# Patient Record
Sex: FEMALE | Race: WHITE | NOT HISPANIC OR LATINO | Employment: FULL TIME | URBAN - METROPOLITAN AREA
[De-identification: names, ages, dates, MRNs, and addresses within clinical notes are randomized per-mention and may not be internally consistent; named-entity substitution may affect disease eponyms.]

---

## 2017-02-28 ENCOUNTER — TRANSCRIBE ORDERS (OUTPATIENT)
Dept: ADMINISTRATIVE | Facility: HOSPITAL | Age: 52
End: 2017-02-28

## 2017-02-28 DIAGNOSIS — R04.2 COUGH WITH HEMOPTYSIS: Primary | ICD-10-CM

## 2017-03-07 ENCOUNTER — APPOINTMENT (OUTPATIENT)
Dept: CT IMAGING | Facility: HOSPITAL | Age: 52
End: 2017-03-07
Attending: INTERNAL MEDICINE

## 2017-03-15 ENCOUNTER — APPOINTMENT (OUTPATIENT)
Dept: CT IMAGING | Facility: HOSPITAL | Age: 52
End: 2017-03-15
Attending: INTERNAL MEDICINE

## 2018-01-23 ENCOUNTER — TRANSCRIBE ORDERS (OUTPATIENT)
Dept: MAMMOGRAPHY | Facility: HOSPITAL | Age: 53
End: 2018-01-23

## 2018-01-23 DIAGNOSIS — Z12.31 VISIT FOR SCREENING MAMMOGRAM: Primary | ICD-10-CM

## 2018-01-25 ENCOUNTER — HOSPITAL ENCOUNTER (OUTPATIENT)
Dept: MAMMOGRAPHY | Facility: HOSPITAL | Age: 53
Discharge: HOME | End: 2018-01-25
Attending: INTERNAL MEDICINE | Admitting: INTERNAL MEDICINE

## 2018-01-25 DIAGNOSIS — Z12.31 VISIT FOR SCREENING MAMMOGRAM: ICD-10-CM

## 2018-01-25 PROCEDURE — 77067 SCR MAMMO BI INCL CAD: CPT

## 2018-01-25 PROCEDURE — 77063 BREAST TOMOSYNTHESIS BI: CPT

## 2018-01-25 PROCEDURE — 77063 BREAST TOMOSYNTHESIS BI: CPT | Performed by: RADIOLOGY

## 2018-01-25 PROCEDURE — 77067 SCR MAMMO BI INCL CAD: CPT | Performed by: RADIOLOGY

## 2018-11-27 ENCOUNTER — HOSPITAL ENCOUNTER (OUTPATIENT)
Dept: GENERAL RADIOLOGY | Facility: HOSPITAL | Age: 53
Discharge: HOME | End: 2018-11-27
Attending: INTERNAL MEDICINE | Admitting: INTERNAL MEDICINE

## 2018-11-27 ENCOUNTER — TRANSCRIBE ORDERS (OUTPATIENT)
Dept: ADMINISTRATIVE | Facility: HOSPITAL | Age: 53
End: 2018-11-27

## 2018-11-27 DIAGNOSIS — M25.562 LEFT KNEE PAIN, UNSPECIFIED CHRONICITY: Primary | ICD-10-CM

## 2018-11-27 DIAGNOSIS — M79.675 PAIN OF LEFT GREAT TOE: ICD-10-CM

## 2018-11-27 PROCEDURE — 73560 X-RAY EXAM OF KNEE 1 OR 2: CPT

## 2018-11-27 PROCEDURE — 73660 X-RAY EXAM OF TOE(S): CPT

## 2019-08-02 ENCOUNTER — TRANSCRIBE ORDERS (OUTPATIENT)
Dept: MAMMOGRAPHY | Facility: HOSPITAL | Age: 54
End: 2019-08-02

## 2019-08-02 DIAGNOSIS — Z12.31 VISIT FOR SCREENING MAMMOGRAM: Primary | ICD-10-CM

## 2022-05-04 ENCOUNTER — OFFICE VISIT (OUTPATIENT)
Dept: FAMILY MEDICINE CLINIC | Facility: CLINIC | Age: 57
End: 2022-05-04

## 2022-05-04 VITALS
BODY MASS INDEX: 23.11 KG/M2 | OXYGEN SATURATION: 98 % | WEIGHT: 130.4 LBS | HEART RATE: 93 BPM | TEMPERATURE: 98.7 F | HEIGHT: 63 IN | DIASTOLIC BLOOD PRESSURE: 80 MMHG | SYSTOLIC BLOOD PRESSURE: 128 MMHG

## 2022-05-04 DIAGNOSIS — J30.9 ALLERGIC RHINITIS, UNSPECIFIED SEASONALITY, UNSPECIFIED TRIGGER: Primary | ICD-10-CM

## 2022-05-04 PROBLEM — M25.552 PAIN OF LEFT HIP JOINT: Status: ACTIVE | Noted: 2022-03-02

## 2022-05-04 PROBLEM — M79.18 MYOFASCIAL PAIN SYNDROME OF THORACIC SPINE: Status: ACTIVE | Noted: 2022-03-02

## 2022-05-04 PROBLEM — M54.50 LOW BACK PAIN: Status: ACTIVE | Noted: 2022-03-02

## 2022-05-04 PROBLEM — F11.93 OPIOID WITHDRAWAL: Status: ACTIVE | Noted: 2022-03-02

## 2022-05-04 PROCEDURE — 99203 OFFICE O/P NEW LOW 30 MIN: CPT | Performed by: NURSE PRACTITIONER

## 2022-05-04 RX ORDER — SERTRALINE HYDROCHLORIDE 100 MG/1
TABLET, FILM COATED ORAL
COMMUNITY
End: 2022-07-12 | Stop reason: SDUPTHER

## 2022-05-04 RX ORDER — DICLOFENAC SODIUM 75 MG/1
TABLET, DELAYED RELEASE ORAL
COMMUNITY
End: 2022-05-04

## 2022-05-04 RX ORDER — LORATADINE AND PSEUDOEPHEDRINE SULFATE 5; 120 MG/1; MG/1
1 TABLET, EXTENDED RELEASE ORAL 2 TIMES DAILY
Qty: 28 TABLET | Refills: 0 | Status: SHIPPED | OUTPATIENT
Start: 2022-05-04 | End: 2022-09-13

## 2022-05-04 RX ORDER — FLUTICASONE PROPIONATE 50 MCG
2 SPRAY, SUSPENSION (ML) NASAL DAILY
Qty: 18.2 ML | Refills: 2 | Status: SHIPPED | OUTPATIENT
Start: 2022-05-04 | End: 2022-09-13

## 2022-05-04 NOTE — PROGRESS NOTES
"Chief Complaint  Cough (Pt was a smoker and recently stop and has a lingering cough for about 2 months ), Sinus Problem (Pt states that she had a cold 2 month ago and since then her lymph nodes have been swollen. ), and Establish Care    Subjective          Marj Walden presents to [facility]  Pt is here today with sinus symptoms (sneezing, nasal congestion, swollen lymph nodes). She took antibiotics about 2-3 weeks ago. States symptoms have been on-going for about 2 months. States symptoms are not getting worse, just not resolving. Pt states she is coughing.     Pt reports that a tv fell on her head about 3-4 months ago. She was not evaluated anywhere after injury. Since that injury, she has been sensitive to light and is having mild headaches. She does not report severe headaches or extreme fatigue out of normal.     Pt reports swallowing difficulty; this is not a new problem. She had scope about a year ago and esopegal dilatation.       Objective   Vital Signs:   /80   Pulse 93   Temp 98.7 °F (37.1 °C)   Ht 160 cm (63\")   Wt 59.1 kg (130 lb 6.4 oz)   SpO2 98%   BMI 23.10 kg/m²     Physical Exam  Constitutional:       Appearance: Normal appearance.   HENT:      Right Ear: Tympanic membrane, ear canal and external ear normal.      Left Ear: Tympanic membrane, ear canal and external ear normal.      Nose: Congestion present.      Mouth/Throat:      Mouth: Mucous membranes are moist.   Cardiovascular:      Rate and Rhythm: Normal rate and regular rhythm.      Heart sounds: Normal heart sounds.   Pulmonary:      Effort: Pulmonary effort is normal.      Breath sounds: Normal breath sounds.   Skin:     General: Skin is warm.   Neurological:      Mental Status: She is alert and oriented to person, place, and time.   Psychiatric:         Mood and Affect: Mood normal.         Behavior: Behavior normal.        Result Review :{Labs  Result Review  Imaging  Med Tab  Media  " Procedures :23}                 Assessment and Plan    Diagnoses and all orders for this visit:    1. Allergic rhinitis, unspecified seasonality, unspecified trigger (Primary)  -     loratadine-pseudoephedrine (Claritin-D 12 Hour) 5-120 MG per 12 hr tablet; Take 1 tablet by mouth 2 (Two) Times a Day for 14 days.  Dispense: 28 tablet; Refill: 0  -     fluticasone (Flonase) 50 MCG/ACT nasal spray; 2 sprays into the nostril(s) as directed by provider Daily for 30 days.  Dispense: 18.2 mL; Refill: 2      BMI has not been calculated during today's encounter.          Follow Up   Return in about 1 month (around 6/4/2022) for Recheck.  Patient was given instructions and counseling regarding her condition or for health maintenance advice. Please see specific information pulled into the AVS if appropriate.

## 2022-05-19 ENCOUNTER — OFFICE VISIT (OUTPATIENT)
Dept: FAMILY MEDICINE CLINIC | Facility: CLINIC | Age: 57
End: 2022-05-19

## 2022-05-19 VITALS
HEIGHT: 63 IN | OXYGEN SATURATION: 98 % | WEIGHT: 128 LBS | DIASTOLIC BLOOD PRESSURE: 74 MMHG | HEART RATE: 89 BPM | SYSTOLIC BLOOD PRESSURE: 120 MMHG | TEMPERATURE: 97.7 F | BODY MASS INDEX: 22.68 KG/M2

## 2022-05-19 DIAGNOSIS — R68.89 SENSITIVITY TO LIGHT: Primary | ICD-10-CM

## 2022-05-19 DIAGNOSIS — J32.9 SINUSITIS, UNSPECIFIED CHRONICITY, UNSPECIFIED LOCATION: ICD-10-CM

## 2022-05-19 PROCEDURE — 99213 OFFICE O/P EST LOW 20 MIN: CPT | Performed by: NURSE PRACTITIONER

## 2022-05-19 RX ORDER — AMOXICILLIN AND CLAVULANATE POTASSIUM 875; 125 MG/1; MG/1
1 TABLET, FILM COATED ORAL 2 TIMES DAILY
Qty: 14 TABLET | Refills: 0 | Status: SHIPPED | OUTPATIENT
Start: 2022-05-19 | End: 2022-05-26

## 2022-05-19 NOTE — PROGRESS NOTES
"Chief Complaint  Allergies (1 mo f/u Pt states she would like to have labs done today./)    Subjective          Marj Walden presents to [facility]  Pt is here today for follow up. She was seen about a month ago for allergy symptoms. She states she had a tv fall on her head about a month ago and symptoms worsened after that. She did not get evaluated after her injury. She did have headaches after the injury. She states she does not have headaches now, but she feels like something is not right. She states she does feels hazy and she has light sensitivity. She has been taking diclofenac daily. She would like to be referred to Neurology for further evaluation.    She is also having trouble going to sleep. We discussed melatonin and also limiting caffeine intake late in the day. She is going to restrict caffeine intake and see if that helps.       Objective   Vital Signs:  /74   Pulse 89   Temp 97.7 °F (36.5 °C)   Ht 160 cm (63\")   Wt 58.1 kg (128 lb)   SpO2 98%   BMI 22.67 kg/m²   BMI is within normal parameters. No other follow-up for BMI required.      Physical Exam  Vitals reviewed.   Constitutional:       Appearance: Normal appearance.   HENT:      Nose: Congestion and rhinorrhea present.      Right Sinus: Maxillary sinus tenderness and frontal sinus tenderness present.      Left Sinus: Maxillary sinus tenderness and frontal sinus tenderness present.      Mouth/Throat:      Mouth: Mucous membranes are moist.   Cardiovascular:      Rate and Rhythm: Normal rate and regular rhythm.      Heart sounds: Normal heart sounds.   Pulmonary:      Effort: Pulmonary effort is normal.      Breath sounds: Normal breath sounds.   Musculoskeletal:         General: Normal range of motion.   Skin:     General: Skin is warm.   Neurological:      Mental Status: She is alert and oriented to person, place, and time.   Psychiatric:         Mood and Affect: Mood normal.         Behavior: " Behavior normal.        Result Review :                 Assessment and Plan    Diagnoses and all orders for this visit:    1. Sensitivity to light (Primary)  -     Ambulatory Referral to Neurology    2. Sinusitis, unspecified chronicity, unspecified location  -     amoxicillin-clavulanate (Augmentin) 875-125 MG per tablet; Take 1 tablet by mouth 2 (Two) Times a Day for 7 days.  Dispense: 14 tablet; Refill: 0             Follow Up   Return in about 1 month (around 6/19/2022) for Annual.  Patient was given instructions and counseling regarding her condition or for health maintenance advice. Please see specific information pulled into the AVS if appropriate.

## 2022-06-10 ENCOUNTER — LAB (OUTPATIENT)
Dept: LAB | Facility: HOSPITAL | Age: 57
End: 2022-06-10

## 2022-06-10 ENCOUNTER — OFFICE VISIT (OUTPATIENT)
Dept: FAMILY MEDICINE CLINIC | Facility: CLINIC | Age: 57
End: 2022-06-10

## 2022-06-10 VITALS
HEIGHT: 63 IN | WEIGHT: 132 LBS | OXYGEN SATURATION: 96 % | DIASTOLIC BLOOD PRESSURE: 64 MMHG | TEMPERATURE: 98 F | BODY MASS INDEX: 23.39 KG/M2 | SYSTOLIC BLOOD PRESSURE: 108 MMHG | HEART RATE: 83 BPM

## 2022-06-10 DIAGNOSIS — N89.8 VAGINAL ITCHING: ICD-10-CM

## 2022-06-10 DIAGNOSIS — Z00.00 ANNUAL PHYSICAL EXAM: Primary | ICD-10-CM

## 2022-06-10 DIAGNOSIS — Z86.39 HISTORY OF ELEVATED LIPIDS: ICD-10-CM

## 2022-06-10 DIAGNOSIS — S09.90XD HEAD TRAUMA, SUBSEQUENT ENCOUNTER: ICD-10-CM

## 2022-06-10 DIAGNOSIS — T14.8XXA BRUISING: ICD-10-CM

## 2022-06-10 DIAGNOSIS — R53.83 FATIGUE, UNSPECIFIED TYPE: ICD-10-CM

## 2022-06-10 DIAGNOSIS — Z00.00 ANNUAL PHYSICAL EXAM: ICD-10-CM

## 2022-06-10 DIAGNOSIS — B37.31 VAGINAL YEAST INFECTION: ICD-10-CM

## 2022-06-10 LAB
25(OH)D3 SERPL-MCNC: 21.8 NG/ML (ref 30–100)
ALBUMIN SERPL-MCNC: 4.8 G/DL (ref 3.5–5.2)
ALBUMIN/GLOB SERPL: 2.5 G/DL
ALP SERPL-CCNC: 89 U/L (ref 39–117)
ALT SERPL W P-5'-P-CCNC: 25 U/L (ref 1–33)
ANION GAP SERPL CALCULATED.3IONS-SCNC: 11.2 MMOL/L (ref 5–15)
AST SERPL-CCNC: 24 U/L (ref 1–32)
BASOPHILS # BLD AUTO: 0.04 10*3/MM3 (ref 0–0.2)
BASOPHILS NFR BLD AUTO: 0.5 % (ref 0–1.5)
BILIRUB SERPL-MCNC: 0.3 MG/DL (ref 0–1.2)
BUN SERPL-MCNC: 10 MG/DL (ref 6–20)
BUN/CREAT SERPL: 12.3 (ref 7–25)
CALCIUM SPEC-SCNC: 9.5 MG/DL (ref 8.6–10.5)
CHLORIDE SERPL-SCNC: 103 MMOL/L (ref 98–107)
CHOLEST SERPL-MCNC: 216 MG/DL (ref 0–200)
CO2 SERPL-SCNC: 24.8 MMOL/L (ref 22–29)
CREAT SERPL-MCNC: 0.81 MG/DL (ref 0.57–1)
DEPRECATED RDW RBC AUTO: 44.2 FL (ref 37–54)
EGFRCR SERPLBLD CKD-EPI 2021: 84.8 ML/MIN/1.73
EOSINOPHIL # BLD AUTO: 0.28 10*3/MM3 (ref 0–0.4)
EOSINOPHIL NFR BLD AUTO: 3.7 % (ref 0.3–6.2)
ERYTHROCYTE [DISTWIDTH] IN BLOOD BY AUTOMATED COUNT: 12.4 % (ref 12.3–15.4)
GLOBULIN UR ELPH-MCNC: 1.9 GM/DL
GLUCOSE SERPL-MCNC: 94 MG/DL (ref 65–99)
HCT VFR BLD AUTO: 45.7 % (ref 34–46.6)
HDLC SERPL-MCNC: 69 MG/DL (ref 40–60)
HGB BLD-MCNC: 15.5 G/DL (ref 12–15.9)
IMM GRANULOCYTES # BLD AUTO: 0.04 10*3/MM3 (ref 0–0.05)
IMM GRANULOCYTES NFR BLD AUTO: 0.5 % (ref 0–0.5)
LDLC SERPL CALC-MCNC: 130 MG/DL (ref 0–100)
LDLC/HDLC SERPL: 1.85 {RATIO}
LYMPHOCYTES # BLD AUTO: 2.11 10*3/MM3 (ref 0.7–3.1)
LYMPHOCYTES NFR BLD AUTO: 28.2 % (ref 19.6–45.3)
MCH RBC QN AUTO: 33.4 PG (ref 26.6–33)
MCHC RBC AUTO-ENTMCNC: 33.9 G/DL (ref 31.5–35.7)
MCV RBC AUTO: 98.5 FL (ref 79–97)
MONOCYTES # BLD AUTO: 0.6 10*3/MM3 (ref 0.1–0.9)
MONOCYTES NFR BLD AUTO: 8 % (ref 5–12)
NEUTROPHILS NFR BLD AUTO: 4.42 10*3/MM3 (ref 1.7–7)
NEUTROPHILS NFR BLD AUTO: 59.1 % (ref 42.7–76)
NRBC BLD AUTO-RTO: 0 /100 WBC (ref 0–0.2)
PLATELET # BLD AUTO: 240 10*3/MM3 (ref 140–450)
PMV BLD AUTO: 11.2 FL (ref 6–12)
POTASSIUM SERPL-SCNC: 4.6 MMOL/L (ref 3.5–5.2)
PROT SERPL-MCNC: 6.7 G/DL (ref 6–8.5)
RBC # BLD AUTO: 4.64 10*6/MM3 (ref 3.77–5.28)
SODIUM SERPL-SCNC: 139 MMOL/L (ref 136–145)
TRIGL SERPL-MCNC: 97 MG/DL (ref 0–150)
TSH SERPL DL<=0.05 MIU/L-ACNC: 2.14 UIU/ML (ref 0.27–4.2)
VLDLC SERPL-MCNC: 17 MG/DL (ref 5–40)
WBC NRBC COR # BLD: 7.49 10*3/MM3 (ref 3.4–10.8)

## 2022-06-10 PROCEDURE — 84443 ASSAY THYROID STIM HORMONE: CPT

## 2022-06-10 PROCEDURE — 85025 COMPLETE CBC W/AUTO DIFF WBC: CPT

## 2022-06-10 PROCEDURE — 80061 LIPID PANEL: CPT

## 2022-06-10 PROCEDURE — 99396 PREV VISIT EST AGE 40-64: CPT | Performed by: NURSE PRACTITIONER

## 2022-06-10 PROCEDURE — 84597 ASSAY OF VITAMIN K: CPT

## 2022-06-10 PROCEDURE — 80053 COMPREHEN METABOLIC PANEL: CPT

## 2022-06-10 PROCEDURE — 82306 VITAMIN D 25 HYDROXY: CPT

## 2022-06-10 RX ORDER — FLUCONAZOLE 150 MG/1
150 TABLET ORAL ONCE
Qty: 1 TABLET | Refills: 0 | Status: SHIPPED | OUTPATIENT
Start: 2022-06-10 | End: 2022-06-10

## 2022-06-10 NOTE — PROGRESS NOTES
"Chief Complaint  Annual Exam (Pt states she's suppose to see Neurology on 7/23 for a head injury but would like to discuss today./) and Vaginal Itching (Pt states she was taking antibiotics and has now had some vaginal itching. )    Subjective          Marj Walden presents to [facility] for   Pt is here today with concerns regarding her head injury that she sustained in February. She states her symptoms seem to be getting worse. She is having sharp pains in her head and in both ears. She states she also feels foggy at times.     Pt is also here for her annual exam. She is fasting for her visit today. She has a history of elevated lipid levels. She has also experienced low Vitamin K levels in the past. We will do routine lab work today.     She is also experiencing pain in her hip joint. She had x-rays done previously that showed signs of arthritis. She said pain is getting slightly worse. She takes Tylenol for head, but nothing for her hip pain. We discussed trying Aleve for discomfort at this time since it is not a constant pain concern, and is more noticeable with activity. She states symptoms are occasional and worse with activity; symptoms are occurring more frequently than in the past.     Objective   Vital Signs:   Vitals:    06/10/22 1028   BP: 108/64   Pulse: 83   Temp: 98 °F (36.7 °C)   SpO2: 96%   Weight: 59.9 kg (132 lb)   Height: 160 cm (63\")   PainSc: 0-No pain     Body mass index is 23.38 kg/m².    BMI is within normal parameters. No other follow-up for BMI required.    Physical Exam  Vitals reviewed.   Constitutional:       Appearance: Normal appearance.   HENT:      Right Ear: Tympanic membrane, ear canal and external ear normal.      Left Ear: Tympanic membrane, ear canal and external ear normal.      Nose: Nose normal.      Mouth/Throat:      Mouth: Mucous membranes are moist.   Eyes:      Conjunctiva/sclera: Conjunctivae normal.      Pupils: Pupils are equal, " round, and reactive to light.   Cardiovascular:      Rate and Rhythm: Normal rate and regular rhythm.      Pulses: Normal pulses.      Heart sounds: Normal heart sounds.   Pulmonary:      Effort: Pulmonary effort is normal.      Breath sounds: Normal breath sounds.   Abdominal:      General: Abdomen is flat. Bowel sounds are normal.      Palpations: Abdomen is soft.   Musculoskeletal:         General: Normal range of motion.      Cervical back: Normal range of motion.   Skin:     General: Skin is warm and dry.   Neurological:      General: No focal deficit present.      Mental Status: She is alert and oriented to person, place, and time.      Motor: Motor function is intact.      Coordination: Coordination is intact.      Gait: Gait is intact.   Psychiatric:         Mood and Affect: Mood normal.         Behavior: Behavior normal.         Thought Content: Thought content normal.        Result Review :                  There is no immunization history on file for this patient.    Health Maintenance   Topic Date Due   • TDAP/TD VACCINES (1 - Tdap) Never done   • ZOSTER VACCINE (1 of 2) Never done   • MAMMOGRAM  01/25/2020   • PAP SMEAR  Never done   • LIPID PANEL  Never done   • INFLUENZA VACCINE  08/01/2022        Assessment and Plan    Diagnoses and all orders for this visit:    1. Annual physical exam (Primary)  -     CBC Auto Differential; Future  -     Comprehensive Metabolic Panel; Future    2. Head trauma, subsequent encounter  -     CT Head Without Contrast; Future    3. Vaginal itching    4. Bruising  -     Vitamin K1; Future    5. History of elevated lipids  -     Lipid Panel; Future    6. Fatigue, unspecified type  -     TSH Rfx On Abnormal To Free T4; Future  -     Vitamin D 25 Hydroxy; Future    7. Vaginal yeast infection    Other orders  -     fluconazole (Diflucan) 150 MG tablet; Take 1 tablet by mouth 1 (One) Time for 1 dose.  Dispense: 1 tablet; Refill: 0        Counseled on health maintenance topics  and preventative care recommendations:       Follow Up   No follow-ups on file.  Patient was given instructions and counseling regarding her condition or for health maintenance advice. Please see specific information pulled into the AVS if appropriate.

## 2022-06-13 ENCOUNTER — HOSPITAL ENCOUNTER (OUTPATIENT)
Dept: CT IMAGING | Facility: HOSPITAL | Age: 57
Discharge: HOME | End: 2022-06-13
Admitting: NURSE PRACTITIONER

## 2022-06-13 DIAGNOSIS — S09.90XD HEAD TRAUMA, SUBSEQUENT ENCOUNTER: ICD-10-CM

## 2022-06-13 PROCEDURE — 70450 CT HEAD/BRAIN W/O DYE: CPT

## 2022-06-18 LAB — PHYTONADIONE SERPL-MCNC: 2.07 NG/ML (ref 0.1–2.2)

## 2022-07-12 ENCOUNTER — OFFICE VISIT (OUTPATIENT)
Dept: FAMILY MEDICINE CLINIC | Facility: CLINIC | Age: 57
End: 2022-07-12

## 2022-07-12 VITALS
HEART RATE: 91 BPM | BODY MASS INDEX: 23.57 KG/M2 | WEIGHT: 133 LBS | TEMPERATURE: 98 F | HEIGHT: 63 IN | SYSTOLIC BLOOD PRESSURE: 112 MMHG | OXYGEN SATURATION: 97 % | DIASTOLIC BLOOD PRESSURE: 64 MMHG

## 2022-07-12 DIAGNOSIS — D22.9 ATYPICAL MOLE: ICD-10-CM

## 2022-07-12 DIAGNOSIS — Z12.31 ENCOUNTER FOR SCREENING MAMMOGRAM FOR MALIGNANT NEOPLASM OF BREAST: ICD-10-CM

## 2022-07-12 DIAGNOSIS — Z12.11 SCREEN FOR COLON CANCER: ICD-10-CM

## 2022-07-12 DIAGNOSIS — F32.A DEPRESSION, UNSPECIFIED DEPRESSION TYPE: Primary | ICD-10-CM

## 2022-07-12 PROCEDURE — 99213 OFFICE O/P EST LOW 20 MIN: CPT | Performed by: NURSE PRACTITIONER

## 2022-07-12 RX ORDER — SERTRALINE HYDROCHLORIDE 100 MG/1
100 TABLET, FILM COATED ORAL DAILY
Qty: 90 TABLET | Refills: 2 | Status: SHIPPED | OUTPATIENT
Start: 2022-07-12

## 2022-07-12 NOTE — PROGRESS NOTES
"Chief Complaint  Depression (Pt states she has been on Zoloft for years and is needing a refill.) and Suspicious Skin Lesion (Pt state she has a mole on her back that she would like to follow up with dermatology about. )    Subjective        aMrj Walden presents to [facility]  Pt is here today to discuss refill for Zoloft. She has been taking zoloft for a long time, but it was prescribed by a previous provider.     Pt also has a mole that is present on her left shoulder/upper back. She states she noticed it about 3 weeks ago and it is getting bigger.     Objective   Vital Signs:  /64   Pulse 91   Temp 98 °F (36.7 °C)   Ht 160 cm (63\")   Wt 60.3 kg (133 lb)   SpO2 97%   BMI 23.56 kg/m²   Estimated body mass index is 23.56 kg/m² as calculated from the following:    Height as of this encounter: 160 cm (63\").    Weight as of this encounter: 60.3 kg (133 lb).    BMI is within normal parameters. No other follow-up for BMI required.      Physical Exam  Vitals reviewed.   Constitutional:       Appearance: Normal appearance.   HENT:      Nose: Nose normal.      Mouth/Throat:      Mouth: Mucous membranes are moist.   Cardiovascular:      Rate and Rhythm: Normal rate and regular rhythm.      Heart sounds: Normal heart sounds.   Pulmonary:      Effort: Pulmonary effort is normal.      Breath sounds: Normal breath sounds.   Musculoskeletal:         General: Normal range of motion.   Skin:     General: Skin is warm and dry.      Findings: Lesion present.          Neurological:      Mental Status: She is alert and oriented to person, place, and time.   Psychiatric:         Mood and Affect: Mood normal.         Behavior: Behavior normal.         Thought Content: Thought content normal.        Result Review :                Assessment and Plan   Diagnoses and all orders for this visit:    1. Depression, unspecified depression type (Primary)    2. Atypical mole  -     Ambulatory " Referral to Dermatology  -     sertraline (ZOLOFT) 100 MG tablet; Take 1 tablet by mouth Daily.  Dispense: 90 tablet; Refill: 2    3. Encounter for screening mammogram for malignant neoplasm of breast  -     Mammo Screening Bilateral With CAD    4. Screen for colon cancer  -     Amb referral for Screening Colonoscopy             Follow Up   No follow-ups on file.  Patient was given instructions and counseling regarding her condition or for health maintenance advice. Please see specific information pulled into the AVS if appropriate.       Answers for HPI/ROS submitted by the patient on 7/7/2022  Please describe your symptoms.: Need antidepressants  Have you had these symptoms before?: Yes  How long have you been having these symptoms?: Greater than 2 weeks  Please list any medications you are currently taking for this condition.: Zoloft  What is the primary reason for your visit?: Other

## 2022-07-20 DIAGNOSIS — Z12.31 ENCOUNTER FOR SCREENING MAMMOGRAM FOR MALIGNANT NEOPLASM OF BREAST: Primary | ICD-10-CM

## 2022-07-21 ENCOUNTER — OFFICE VISIT (OUTPATIENT)
Dept: NEUROLOGY | Facility: CLINIC | Age: 57
End: 2022-07-21

## 2022-07-21 VITALS
WEIGHT: 133 LBS | OXYGEN SATURATION: 99 % | DIASTOLIC BLOOD PRESSURE: 62 MMHG | HEART RATE: 92 BPM | SYSTOLIC BLOOD PRESSURE: 122 MMHG | BODY MASS INDEX: 23.56 KG/M2

## 2022-07-21 DIAGNOSIS — M54.81 CERVICO-OCCIPITAL NEURALGIA: Primary | ICD-10-CM

## 2022-07-21 DIAGNOSIS — M54.2 NECK PAIN: ICD-10-CM

## 2022-07-21 PROCEDURE — 99204 OFFICE O/P NEW MOD 45 MIN: CPT | Performed by: PSYCHIATRY & NEUROLOGY

## 2022-07-21 RX ORDER — RIZATRIPTAN BENZOATE 10 MG/1
TABLET ORAL
Qty: 8 TABLET | Refills: 3 | Status: SHIPPED | OUTPATIENT
Start: 2022-07-21

## 2022-07-21 RX ORDER — AMITRIPTYLINE HYDROCHLORIDE 10 MG/1
TABLET, FILM COATED ORAL
Qty: 60 TABLET | Refills: 5 | Status: SHIPPED | OUTPATIENT
Start: 2022-07-21 | End: 2022-09-13

## 2022-07-21 RX ORDER — DICLOFENAC SODIUM AND MISOPROSTOL 75; 200 MG/1; UG/1
1 TABLET, DELAYED RELEASE ORAL AS NEEDED
COMMUNITY
End: 2022-09-13

## 2022-07-21 NOTE — PROGRESS NOTES
Subjective:    CC: Marj Walden is seen today in consultation at the request of CARLY Ramirez for Head Injury       HPI:  57-year-old female with a past medical history of chronic smoking,, borderline hyperlipidemia, depression presents with headaches.  As per patient she had a TV fall on her head in February.  She did have a CT head that was unremarkable.  Since then she has had frequent headaches that start off in the back of her head and neck with nausea, photophobia and occasional ear pain.  She also has photosensitivity where her eyes start to water when looking at bright lights or sunlight.  She is currently taking diclofenac 3-4 times a week for the headaches.  Also reports to poor quality of sleep at night.  Takes Zoloft for her mood.  She also complains of mild neck pain and stiffness without any radicular symptoms.    The following portions of the patient's history were reviewed today and updated as of 07/21/2022  : allergies, current medications, past family history, past medical history, past social history, past surgical history and problem list  These document will be scanned to patient's chart.      Current Outpatient Medications:   •  diclofenac-miSOPROStol (ARTHROTEC 75) 75-0.2 MG EC tablet, Take 1 tablet by mouth As Needed., Disp: , Rfl:   •  sertraline (ZOLOFT) 100 MG tablet, Take 1 tablet by mouth Daily., Disp: 90 tablet, Rfl: 2  •  amitriptyline (ELAVIL) 10 MG tablet, Take 1 tablet at night for 2 weeks.  If no improvement in headaches increase to 2 tablets at night thereafter, Disp: 60 tablet, Rfl: 5  •  fluticasone (Flonase) 50 MCG/ACT nasal spray, 2 sprays into the nostril(s) as directed by provider Daily for 30 days., Disp: 18.2 mL, Rfl: 2  •  loratadine-pseudoephedrine (Claritin-D 12 Hour) 5-120 MG per 12 hr tablet, Take 1 tablet by mouth 2 (Two) Times a Day for 14 days., Disp: 28 tablet, Rfl: 0  •  rizatriptan (Maxalt) 10 MG tablet, Take 1 tablet at onset of headache.  May  repeat once in 2 hours.  Do not take more than 2 tabs a day or 8 tabs a month, Disp: 8 tablet, Rfl: 3   Past Medical History:   Diagnosis Date   • Depression    • Hyperlipidemia       Past Surgical History:   Procedure Laterality Date   • REDUCTION MAMMAPLASTY        Family History   Problem Relation Age of Onset   • Cancer Mother    • Hyperlipidemia Father    • Heart attack Father    • Cancer Sister    • Breast cancer Sister 42   • Heart attack Paternal Uncle    • Heart attack Paternal Grandfather       Social History     Socioeconomic History   • Marital status:    Tobacco Use   • Smoking status: Former Smoker     Packs/day: 1.00     Years: 30.00     Pack years: 30.00     Types: Cigarettes     Quit date: 3/10/2022     Years since quittin.3   • Smokeless tobacco: Never Used   Substance and Sexual Activity   • Alcohol use: Not Currently     Alcohol/week: 3.0 standard drinks     Types: 3 Shots of liquor per week   • Drug use: Yes     Types: Marijuana     Review of Systems    Objective:    /62   Pulse 92   Wt 60.3 kg (133 lb)   SpO2 99%   BMI 23.56 kg/m²     Neurology Exam:    General apperance: NAD.  Mild tenderness to palpation of both left more than right occipital region    Mental status: Alert, awake and oriented to time place and person.    Recent and Remote memory: Intact.    Attention span and Concentration: Normal.     Language and Speech: Intact- No dysarthria.    Fluency, Naming , Repitition and Comprehension:  Intact    Cranial Nerves:   CN II: Visual fields are full. Intact. Fundi - Normal, No papillederma, Pupils - DIONICIO  CN III, IV and VI: Extraocular movements are intact. Normal saccades.   CN V: Facial sensation is intact.   CN VII: Muscles of facial expression reveal no asymmetry. Intact.   CN VIII: Hearing is intact. Whispered voice intact.   CN IX and X: Palate elevates symmetrically. Intact  CN XI: Shoulder shrug is intact.   CN XII: Tongue is midline without evidence of  atrophy or fasciculation.     Ophthalmoscopic exam of optic disc-normal    Motor:  Right UE muscle strength 5/5. Normal tone.     Left UE muscle strength 5/5. Normal tone.      Right LE muscle strength5/5. Normal tone.     Left LE muscle strength 5/5. Normal tone.      Sensory: Normal light touch, vibration and pinprick sensation bilaterally.    DTRs: 2+ bilaterally in upper and lower extremities.    Babinski: Negative bilaterally.    Co-ordination: Normal finger-to-nose, heel to shin B/L.    Rhomberg: Negative.    Gait: Normal.    Cardiovascular: Regular rate and rhythm without murmur, gallop or rub.    Assessment and Plan:  1. Cervico-occipital neuralgia  Patient could have cervical occipital neuralgia with some migraine features  I will start her on amitriptyline gradually increasing to 20 mg which will help with the headaches and her sleep quality  For abortive treatment of her headaches she can take Maxalt 5 to 10 mg as needed.  Should stop taking diclofenac  If amitriptyline does not help I will schedule her for an occipital nerve block    2. Neck pain  I offered her a PT referral but she will first find out from her insurance to see if it will be covered.       Return in about 6 weeks (around 9/1/2022).     I spent over 40 minutes with the patient face to face out of which over 50% (30 minutes) was spent in management, instructions and education.     Cathi Mckenna MD

## 2022-08-29 ENCOUNTER — HOSPITAL ENCOUNTER (OUTPATIENT)
Dept: MAMMOGRAPHY | Facility: HOSPITAL | Age: 57
Discharge: HOME | End: 2022-08-29
Admitting: NURSE PRACTITIONER

## 2022-08-29 DIAGNOSIS — Z12.31 ENCOUNTER FOR SCREENING MAMMOGRAM FOR MALIGNANT NEOPLASM OF BREAST: ICD-10-CM

## 2022-08-29 PROCEDURE — 77067 SCR MAMMO BI INCL CAD: CPT | Performed by: RADIOLOGY

## 2022-08-29 PROCEDURE — 77067 SCR MAMMO BI INCL CAD: CPT

## 2022-08-29 PROCEDURE — 77063 BREAST TOMOSYNTHESIS BI: CPT

## 2022-08-29 PROCEDURE — 77063 BREAST TOMOSYNTHESIS BI: CPT | Performed by: RADIOLOGY

## 2022-09-13 ENCOUNTER — OFFICE VISIT (OUTPATIENT)
Dept: NEUROLOGY | Facility: CLINIC | Age: 57
End: 2022-09-13

## 2022-09-13 VITALS
WEIGHT: 132 LBS | SYSTOLIC BLOOD PRESSURE: 116 MMHG | HEART RATE: 86 BPM | OXYGEN SATURATION: 98 % | DIASTOLIC BLOOD PRESSURE: 68 MMHG | BODY MASS INDEX: 23.38 KG/M2

## 2022-09-13 DIAGNOSIS — M54.81 CERVICO-OCCIPITAL NEURALGIA: Primary | ICD-10-CM

## 2022-09-13 DIAGNOSIS — M54.2 NECK PAIN: ICD-10-CM

## 2022-09-13 PROCEDURE — 99213 OFFICE O/P EST LOW 20 MIN: CPT | Performed by: PSYCHIATRY & NEUROLOGY

## 2022-09-13 RX ORDER — AMITRIPTYLINE HYDROCHLORIDE 25 MG/1
25 TABLET, FILM COATED ORAL NIGHTLY
Qty: 30 TABLET | Refills: 5 | Status: SHIPPED | OUTPATIENT
Start: 2022-09-13

## 2022-09-13 NOTE — PROGRESS NOTES
Subjective:    CC: Marj Walden is seen today for headaches       Current visit- patient states since she started taking amitriptyline now at 20 mg her headache frequency has improved significantly.  In fact in the past month she has not had to take any rizatriptan as she did not have any headaches.  Her neck pain is also better.  She has stopped taking diclofenac and only takes it as needed for back pain.  She still has some difficulty falling asleep at night.    Initial [id number]-old female with a past medical history of chronic smoking,, borderline hyperlipidemia, depression presents with headaches.  As per patient she had a TV fall on her head in February.  She did have a CT head that was unremarkable.  Since then she has had frequent headaches that start off in the back of her head and neck with nausea, photophobia and occasional ear pain.  She also has photosensitivity where her eyes start to water when looking at bright lights or sunlight.  She is currently taking diclofenac 3-4 times a week for the headaches.  Also reports to poor quality of sleep at night.  Takes Zoloft for her mood.  She also complains of mild neck pain and stiffness without any radicular symptoms.    The following portions of the patient's history were reviewed today and updated as of 07/21/2022  : allergies, current medications, past family history, past medical history, past social history, past surgical history and problem list  These document will be scanned to patient's chart.      Current Outpatient Medications:   •  rizatriptan (Maxalt) 10 MG tablet, Take 1 tablet at onset of headache.  May repeat once in 2 hours.  Do not take more than 2 tabs a day or 8 tabs a month, Disp: 8 tablet, Rfl: 3  •  sertraline (ZOLOFT) 100 MG tablet, Take 1 tablet by mouth Daily., Disp: 90 tablet, Rfl: 2  •  amitriptyline (ELAVIL) 25 MG tablet, Take 1 tablet by mouth Every Night., Disp: 30 tablet, Rfl: 5   Past Medical History:   Diagnosis Date    • Depression    • Hyperlipidemia       Past Surgical History:   Procedure Laterality Date   • BREAST BIOPSY      pt unsure   • REDUCTION MAMMAPLASTY        Family History   Problem Relation Age of Onset   • Breast cancer Mother 85   • Cancer Mother    • Hyperlipidemia Father    • Heart attack Father    • Cancer Sister    • Breast cancer Sister 42   • Heart attack Paternal Grandfather    • Heart attack Paternal Uncle    • Ovarian cancer Neg Hx       Social History     Socioeconomic History   • Marital status:    Tobacco Use   • Smoking status: Former Smoker     Packs/day: 1.00     Years: 30.00     Pack years: 30.00     Types: Cigarettes     Quit date: 3/10/2022     Years since quittin.5   • Smokeless tobacco: Never Used   Substance and Sexual Activity   • Alcohol use: Not Currently     Alcohol/week: 3.0 standard drinks     Types: 3 Shots of liquor per week   • Drug use: Yes     Types: Marijuana     Review of Systems   Neurological: Positive for headache.   All other systems reviewed and are negative.      Objective:    /68   Pulse 86   Wt 59.9 kg (132 lb)   SpO2 98%   BMI 23.38 kg/m²     Neurology Exam:    General apperance: NAD.  Mild tenderness to palpation of both left more than right occipital region    Mental status: Alert, awake and oriented to time place and person.    Recent and Remote memory: Intact.    Attention span and Concentration: Normal.     Language and Speech: Intact- No dysarthria.    Fluency, Naming , Repitition and Comprehension:  Intact    Cranial Nerves:   CN II: Visual fields are full. Intact. Fundi - Normal, No papillederma, Pupils - DIONICIO  CN III, IV and VI: Extraocular movements are intact. Normal saccades.   CN V: Facial sensation is intact.   CN VII: Muscles of facial expression reveal no asymmetry. Intact.   CN VIII: Hearing is intact. Whispered voice intact.   CN IX and X: Palate elevates symmetrically. Intact  CN XI: Shoulder shrug is intact.   CN XII: Tongue is  midline without evidence of atrophy or fasciculation.     Ophthalmoscopic exam of optic disc-normal    Motor:  Right UE muscle strength 5/5. Normal tone.     Left UE muscle strength 5/5. Normal tone.      Right LE muscle strength5/5. Normal tone.     Left LE muscle strength 5/5. Normal tone.      Sensory: Normal light touch, vibration and pinprick sensation bilaterally.    DTRs: 2+ bilaterally in upper and lower extremities.    Babinski: Negative bilaterally.    Co-ordination: Normal finger-to-nose, heel to shin B/L.    Rhomberg: Negative.    Gait: Normal.    Cardiovascular: Regular rate and rhythm without murmur, gallop or rub.    Assessment and Plan:  1. Cervico-occipital neuralgia  Patient could have cervical occipital neuralgia with some migraine features  Even though her headaches have improved I have told her to increase her amitriptyline slightly to 25 mg nightly as she still has some difficulty falling asleep  For abortive treatment of her headaches she can take Maxalt 5 to 10 mg as needed.  Has stopped taking diclofenac    2. Neck pain  Does not need PT as her neck pain has improved       Return in about 4 months (around 1/13/2023).         Cathi Mckenna MD

## 2022-12-01 ENCOUNTER — HOSPITAL ENCOUNTER (EMERGENCY)
Facility: HOSPITAL | Age: 57
Discharge: HOME | End: 2022-12-01
Attending: STUDENT IN AN ORGANIZED HEALTH CARE EDUCATION/TRAINING PROGRAM | Admitting: STUDENT IN AN ORGANIZED HEALTH CARE EDUCATION/TRAINING PROGRAM

## 2022-12-01 ENCOUNTER — APPOINTMENT (OUTPATIENT)
Dept: GENERAL RADIOLOGY | Facility: HOSPITAL | Age: 57
End: 2022-12-01

## 2022-12-01 VITALS
WEIGHT: 140 LBS | DIASTOLIC BLOOD PRESSURE: 76 MMHG | RESPIRATION RATE: 16 BRPM | HEIGHT: 63 IN | OXYGEN SATURATION: 98 % | BODY MASS INDEX: 24.8 KG/M2 | HEART RATE: 86 BPM | SYSTOLIC BLOOD PRESSURE: 124 MMHG | TEMPERATURE: 98.2 F

## 2022-12-01 DIAGNOSIS — S52.022A CLOSED FRACTURE OF OLECRANON PROCESS OF LEFT ULNA, INITIAL ENCOUNTER: Primary | ICD-10-CM

## 2022-12-01 DIAGNOSIS — M79.602 PAIN OF LEFT UPPER EXTREMITY: ICD-10-CM
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
## 2022-12-01 PROCEDURE — 73070 X-RAY EXAM OF ELBOW: CPT

## 2022-12-01 PROCEDURE — 99283 EMERGENCY DEPT VISIT LOW MDM: CPT

## 2022-12-01 PROCEDURE — 73090 X-RAY EXAM OF FOREARM: CPT

## 2022-12-01 RX ORDER — OXYCODONE HYDROCHLORIDE 5 MG/1
5 TABLET ORAL ONCE
Status: COMPLETED | OUTPATIENT
Start: 2022-12-01 | End: 2022-12-01

## 2022-12-01 RX ORDER — OXYCODONE HYDROCHLORIDE 5 MG/1
5 TABLET ORAL EVERY 4 HOURS PRN
Qty: 6 TABLET | Refills: 0 | Status: SHIPPED | OUTPATIENT
Start: 2022-12-01

## 2022-12-01 RX ORDER — IBUPROFEN 600 MG/1
600 TABLET ORAL EVERY 6 HOURS PRN
Qty: 20 TABLET | Refills: 0 | Status: SHIPPED | OUTPATIENT
Start: 2022-12-01

## 2022-12-01 RX ORDER — IBUPROFEN 600 MG/1
600 TABLET ORAL ONCE
Status: COMPLETED | OUTPATIENT
Start: 2022-12-01 | End: 2022-12-01

## 2022-12-01 RX ADMIN — OXYCODONE 5 MG: 5 TABLET ORAL at 05:39

## 2022-12-01 RX ADMIN — IBUPROFEN 600 MG: 600 TABLET ORAL at 05:38

## 2022-12-01 NOTE — DISCHARGE INSTRUCTIONS
Contact the provided orthopedist to arrange follow-up.  Let them know you are seen in the emergency department and he is aware of your case.  Treat pain as needed with the provided pain medication.  If you develop significantly worsening pain any weakness numbness or other concerning symptoms please return to the ED.

## 2022-12-04 NOTE — ED PROVIDER NOTES
EMERGENCY DEPARTMENT ENCOUNTER    Pt Name: Marj Walden  MRN: [id number]  Pt :   1965  Room Number:    Date of encounter:  2022  PCP: Linda Simpson APRN  ED Provider: Khai Chen MD    Historian: Patient      HPI:  Chief Complaint: Fall, arm pain        Context: Marj Walden is a 57-year-old woman who presents the emergency department for evaluation of severe left elbow pain after a fall from standing she was on a tile floor when she slipped and fell backwards landing directly on her left elbow she did not hit her head or lose consciousness.  She had immediate severe pain in the elbow but denies pain distal to that.  She denies any weakness or numbness.  She denies pain elsewhere.  She has been able to ambulate since the fall.  Pain is currently severe.    PAST MEDICAL HISTORY  Past Medical History:   Diagnosis Date   • Depression    • Hyperlipidemia          PAST SURGICAL HISTORY  Past Surgical History:   Procedure Laterality Date   • BREAST BIOPSY      pt unsure   • REDUCTION MAMMAPLASTY           FAMILY HISTORY  Family History   Problem Relation Age of Onset   • Breast cancer Mother 85   • Cancer Mother    • Hyperlipidemia Father    • Heart attack Father    • Cancer Sister    • Breast cancer Sister 42   • Heart attack Paternal Grandfather    • Heart attack Paternal Uncle    • Ovarian cancer Neg Hx          SOCIAL HISTORY  Social History     Socioeconomic History   • Marital status:    Tobacco Use   • Smoking status: Former     Packs/day: 1.00     Years: 30.00     Pack years: 30.00     Types: Cigarettes     Quit date: 3/10/2022     Years since quittin.7   • Smokeless tobacco: Never   Substance and Sexual Activity   • Alcohol use: Not Currently     Alcohol/week: 3.0 standard drinks     Types: 3 Shots of liquor per week   • Drug use: Yes     Types: Marijuana         ALLERGIES  Sulfa antibiotics        REVIEW OF SYSTEMS  Review of Systems       All systems reviewed  and negative except for those discussed in HPI.       PHYSICAL EXAM    I have reviewed the triage vital signs and nursing notes.    ED Triage Vitals [12/01/22 0209]   Temp Heart Rate Resp BP SpO2   98.2 °F (36.8 °C) 98 20 126/86 98 %      Temp src Heart Rate Source Patient Position BP Location FiO2 (%)   Oral Monitor Sitting Left arm --       Physical Exam  GENERAL:   Appears uncomfortable but in no acute distress  HENT: Nares patent.  EYES: No scleral icterus.  CV: Regular rhythm, regular rate.  RESPIRATORY: Normal effort.  No audible wheezes, rales or rhonchi.  ABDOMEN: Soft, nontender  MUSCULOSKELETAL: Tenderness and mild swelling around the left elbow, no tenderness over the wrist forearm or humerus.  Palpable radial pulse, intact  strength and thumb opposition.  Recognizes light touch in the median, radial, ulnar distribution.  NEURO: Alert, moves all extremities, follows commands.  SKIN: Warm, dry, no rash visualized.        LAB RESULTS  No results found for this or any previous visit (from the past 24 hour(s)).    If labs were ordered, I independently reviewed the results.        RADIOLOGY  No Radiology Exams Resulted Within Past 24 Hours    I ordered and reviewed the above noted radiographic studies.      I viewed images of x-ray of the left elbow which shows intra-articular fracture of the olecranon process.  See radiologist's dictation for official interpretation.        PROCEDURES    Procedures    No orders to display       MEDICATIONS GIVEN IN ER    Medications   oxyCODONE (ROXICODONE) immediate release tablet 5 mg (5 mg Oral Given 12/1/22 1064)   ibuprofen (ADVIL,MOTRIN) tablet 600 mg (600 mg Oral Given 12/1/22 5770)         PROGRESS, DATA ANALYSIS, CONSULTS, AND MEDICAL DECISION MAKING    All labs have been independently reviewed by me.  All radiology studies have been reviewed by me and the radiologist dictating the report.   EKG's have been independently viewed and interpreted by me.             ED Course as of 12/04/22 1032   Thu Dec 01, 2022   0544 This is a very nice 57-year-old woman who presents the emergency department for evaluation of severe left elbow pain after a fall from standing she was on a tile floor when she slipped and fell backwards landing directly on her left elbow she did not hit her head or lose consciousness.  She had immediate severe pain in the elbow but denies pain distal to that.  She denies any weakness or numbness.  She denies pain elsewhere.  She has been able to ambulate since the fall.  Pain is currently severe. [CC]      ED Course User Index  [CC] Khai Chen MD       She is awake and alert but with obvious tenderness around the left elbow x-ray shows intra-articular olecranon process fracture without significant displacement.  Discussed with Dr. Nobles from the on-call orthopedist who agrees that is minimally displaced but still suspect that will require outpatient surgery.  She was immobilized in a posterior long-arm splint and placed in a sling for comfort.  The sling had to be adjusted.  Provided pain medication strict return precautions.  We will follow-up with orthopedic surgery.      AS OF 10:32 EST VITALS:    BP - 124/76  HR - 86  TEMP - 98.2 °F (36.8 °C) (Oral)  O2 SATS - 98%                  DIAGNOSIS  Final diagnoses:   Closed fracture of olecranon process of left ulna, initial encounter   Fall, initial encounter   Pain of left upper extremity         DISPOSITION  DISCHARGE    Patient discharged in stable condition.    Reviewed implications of results, diagnosis, meds, responsibility to follow up, warning signs and symptoms of possible worsening, potential complications and reasons to return to ER.    Patient/Family voiced understanding of above instructions.    Discussed plan for discharge, as there is no emergent indication for admission.  Pt/family is agreeable and understands need for follow up and possible repeat testing.  Pt/family is  aware that discharge does not mean that nothing is wrong but that it indicates no emergency is currently present that requires admission and they must continue care with follow-up as given below or with a physician of their choice.     FOLLOW-UP  Adrian Nobles MD  [address] [place] [address] [phone]    Call            Medication List      New Prescriptions    ibuprofen 600 MG tablet  Commonly known as: ADVIL,MOTRIN  Take 1 tablet by mouth Every 6 (Six) Hours As Needed for Mild Pain.     oxyCODONE 5 MG immediate release tablet  Commonly known as: Roxicodone  Take 1 tablet by mouth Every 4 (Four) Hours As Needed for Severe Pain.           Where to Get Your Medications      These medications were sent to [id number] [place] [facility] [address]  AT [facility] - [phone]  - [phone] FX  [address] [place] [address]    Phone: [phone]   · ibuprofen 600 MG tablet  · oxyCODONE 5 MG immediate release tablet                    Khai Chen MD  12/04/22 1037

## 2023-03-01 ENCOUNTER — TELEPHONE (OUTPATIENT)
Dept: FAMILY MEDICINE CLINIC | Facility: CLINIC | Age: 58
End: 2023-03-01
Payer: OTHER MISCELLANEOUS

## 2023-03-01 NOTE — TELEPHONE ENCOUNTER
Attempted to contact patient, no answer. Left voicemail for patient to call the office back (office # given).     Hub may relay message & document.    Our office received a form from   for additional information regarding an EGD however our office did not order that test. Would she like to have the upper and lower endoscopy done? Who ordered? What symptoms is she having that require the test? If she sees another office for this please let us know so that we can fax over a records request.     Message needs to be routed to PCP's MA once pt's responds. (form given to PCP's MA)

## 2023-04-25 ENCOUNTER — TELEPHONE (OUTPATIENT)
Dept: NEUROLOGY | Facility: CLINIC | Age: 58
End: 2023-04-25
Payer: OTHER MISCELLANEOUS

## 2023-04-25 ENCOUNTER — OFFICE VISIT (OUTPATIENT)
Dept: NEUROLOGY | Facility: CLINIC | Age: 58
End: 2023-04-25
Payer: MEDICAID

## 2023-04-25 VITALS — HEIGHT: 63 IN | BODY MASS INDEX: 24.8 KG/M2 | WEIGHT: 140 LBS

## 2023-04-25 DIAGNOSIS — M54.81 CERVICO-OCCIPITAL NEURALGIA: Primary | ICD-10-CM

## 2023-04-25 PROCEDURE — 99214 OFFICE O/P EST MOD 30 MIN: CPT | Performed by: PSYCHIATRY & NEUROLOGY

## 2023-04-25 RX ORDER — AMITRIPTYLINE HYDROCHLORIDE 25 MG/1
50 TABLET, FILM COATED ORAL NIGHTLY
Qty: 60 TABLET | Refills: 5 | Status: SHIPPED | OUTPATIENT
Start: 2023-04-25

## 2023-04-25 RX ORDER — ONDANSETRON 4 MG/1
TABLET, FILM COATED ORAL EVERY 8 HOURS
COMMUNITY
End: 2023-04-25

## 2023-04-25 NOTE — PROGRESS NOTES
Subjective:    CC: Marj Walden is seen today for headaches       Current visit- patient states that she stopped taking amitriptyline as it stopped working for her headaches.  She is currently having near daily headaches that start of either in the front or back of her head wrapping around.  She also has neck pain and stiffness again.  She tried Maxalt when the headache had setting but it did not help.     Last visit-patient states since she started taking amitriptyline now at 20 mg her headache frequency has improved significantly.  In fact in the past month she has not had to take any rizatriptan as she did not have any headaches.  Her neck pain is also better.  She has stopped taking diclofenac and only takes it as needed for back pain.  She still has some difficulty falling asleep at night.    Initial [id number]-old female with a past medical history of chronic smoking,, borderline hyperlipidemia, depression presents with headaches.  As per patient she had a TV fall on her head in February.  She did have a CT head that was unremarkable.  Since then she has had frequent headaches that start off in the back of her head and neck with nausea, photophobia and occasional ear pain.  She also has photosensitivity where her eyes start to water when looking at bright lights or sunlight.  She is currently taking diclofenac 3-4 times a week for the headaches.  Also reports to poor quality of sleep at night.  Takes Zoloft for her mood.  She also complains of mild neck pain and stiffness without any radicular symptoms.    The following portions of the patient's history were reviewed today and updated as of 07/21/2022  : allergies, current medications, past family history, past medical history, past social history, past surgical history and problem list  These document will be scanned to patient's chart.      Current Outpatient Medications:   •  amitriptyline (ELAVIL) 25 MG tablet, Take 2 tablets by mouth Every Night.,  "Disp: 60 tablet, Rfl: 5  •  rizatriptan (Maxalt) 10 MG tablet, Take 1 tablet at onset of headache.  May repeat once in 2 hours.  Do not take more than 2 tabs a day or 8 tabs a month (Patient not taking: Reported on 2023), Disp: 8 tablet, Rfl: 3   Past Medical History:   Diagnosis Date   • Depression    • Hyperlipidemia       Past Surgical History:   Procedure Laterality Date   • BREAST BIOPSY      pt unsure   • REDUCTION MAMMAPLASTY        Family History   Problem Relation Age of Onset   • Breast cancer Mother 85   • Cancer Mother    • Hyperlipidemia Father    • Heart attack Father    • Cancer Sister    • Breast cancer Sister 42   • Heart attack Paternal Grandfather    • Heart attack Paternal Uncle    • Ovarian cancer Neg Hx       Social History     Socioeconomic History   • Marital status:    Tobacco Use   • Smoking status: Former     Packs/day: 1.00     Years: 30.00     Pack years: 30.00     Types: Cigarettes     Quit date: 3/10/2022     Years since quittin.1     Passive exposure: Past   • Smokeless tobacco: Never   Substance and Sexual Activity   • Alcohol use: Not Currently     Alcohol/week: 3.0 standard drinks     Types: 3 Shots of liquor per week   • Drug use: Yes     Types: Marijuana   • Sexual activity: Defer     Review of Systems   Musculoskeletal: Positive for neck pain and neck stiffness.   Neurological: Positive for headache.   All other systems reviewed and are negative.      Objective:    Ht 160 cm (63\")   Wt 63.5 kg (140 lb)   BMI 24.80 kg/m²     Neurology Exam:    General apperance: NAD.  Mild tenderness to palpation of both left more than right occipital region    Mental status: Alert, awake and oriented to time place and person.    Recent and Remote memory: Intact.    Attention span and Concentration: Normal.     Language and Speech: Intact- No dysarthria.    Fluency, Naming , Repitition and Comprehension:  Intact    Cranial Nerves:   CN II: Visual fields are full. Intact. Fundi " - Normal, No papillederma, Pupils - DIONICIO  CN III, IV and VI: Extraocular movements are intact. Normal saccades.   CN V: Facial sensation is intact.   CN VII: Muscles of facial expression reveal no asymmetry. Intact.   CN VIII: Hearing is intact. Whispered voice intact.   CN IX and X: Palate elevates symmetrically. Intact  CN XI: Shoulder shrug is intact.   CN XII: Tongue is midline without evidence of atrophy or fasciculation.     Ophthalmoscopic exam of optic disc-normal    Motor: Tenderness to palpation of the right occipital region  Right UE muscle strength 5/5. Normal tone.     Left UE muscle strength 5/5. Normal tone.      Right LE muscle strength5/5. Normal tone.     Left LE muscle strength 5/5. Normal tone.      Sensory: Normal light touch, vibration and pinprick sensation bilaterally.    DTRs: 2+ bilaterally in upper and lower extremities.    Babinski: Negative bilaterally.    Co-ordination: Normal finger-to-nose, heel to shin B/L.    Rhomberg: Negative.    Gait: Normal.    Cardiovascular: Regular rate and rhythm without murmur, gallop or rub.    Assessment and Plan:  1. Cervico-occipital neuralgia  Patient has a combination of cervical occipital neuralgia as well as tension headaches with some migraine features  I will restart her on amitriptyline 25 mg nightly for 1 week then 37.5 mg nightly for 2 weeks then 50 mg nightly  For abortive treatment of her headaches I have given her samples of Nurtec to try.  She can also try Maxalt again but has to take it right at the onset of her headache  If her headaches do not completely go away I may give her an occipital nerve block/trigger point injections at her next visit         Return in about 2 months (around 6/25/2023).     I spent 25 minutes out of which 18 minutes was spent face-to-face    Cathi Mckenna MD

## 2023-04-25 NOTE — TELEPHONE ENCOUNTER
LEFT VOICE MAIL THAT THE PATIENT CURRENTLY DOES NOT HAVE ANY INSURANCE. [facility] IS NOT EFFECTIVE UNTIL 5.1.2023 PER JUST[facility] SELF REF# [id number]    CALLED PHILIP[facility] AT [facility] AND SHE WAS TERMED 2.28.2023. REF# [id number]